# Patient Record
Sex: FEMALE | Race: WHITE | Employment: FULL TIME | URBAN - METROPOLITAN AREA
[De-identification: names, ages, dates, MRNs, and addresses within clinical notes are randomized per-mention and may not be internally consistent; named-entity substitution may affect disease eponyms.]

---

## 2018-06-22 LAB — CREATININE, EXTERNAL: 0.56

## 2019-01-04 ENCOUNTER — OFFICE VISIT (OUTPATIENT)
Dept: ENDOCRINOLOGY | Age: 54
End: 2019-01-04

## 2019-01-04 VITALS
BODY MASS INDEX: 28.39 KG/M2 | HEART RATE: 88 BPM | WEIGHT: 160.2 LBS | SYSTOLIC BLOOD PRESSURE: 121 MMHG | DIASTOLIC BLOOD PRESSURE: 71 MMHG | HEIGHT: 63 IN

## 2019-01-04 DIAGNOSIS — E06.3 HYPOTHYROIDISM DUE TO HASHIMOTO'S THYROIDITIS: Primary | ICD-10-CM

## 2019-01-04 DIAGNOSIS — E03.8 HYPOTHYROIDISM DUE TO HASHIMOTO'S THYROIDITIS: Primary | ICD-10-CM

## 2019-01-04 DIAGNOSIS — E04.1 THYROID NODULE: ICD-10-CM

## 2019-01-04 RX ORDER — CHOLECALCIFEROL (VITAMIN D3) 125 MCG
4000 CAPSULE ORAL DAILY
COMMUNITY

## 2019-01-04 RX ORDER — SUMATRIPTAN 100 MG/1
100 TABLET, FILM COATED ORAL
COMMUNITY
Start: 2016-01-06

## 2019-01-04 RX ORDER — MONTELUKAST SODIUM 4 MG/1
TABLET, CHEWABLE ORAL
Refills: 0 | COMMUNITY
Start: 2018-12-18

## 2019-01-04 RX ORDER — AMLODIPINE AND VALSARTAN 5; 320 MG/1; MG/1
TABLET ORAL
COMMUNITY
Start: 2017-07-13

## 2019-01-04 NOTE — PROGRESS NOTES
CONSULTATION REQUESTED BY: Anais Swann MD     REASON FOR CONSULT: Evaluation of thyroid nodule    CHIEF COMPLAINT: Thyroid nodule, hashimoto disease    HISTORY OF PRESENT ILLNESS:   David Schmidt is a 47 y.o. female with a PMHx as noted below who was referred to our endocrinology clinic for evaluation of a thyroid nodule and labs suggestive of hashimoto thyroiditis. Patient describes that she was diagnosed with CREST syndrome and Hashimoto thyroiditis. Also noted for a thyroid nodule with endocrinologist in [facility] in Sept,  12/18/18: Thyroid FNA Biopsy: well circ, R-mid hypoechoic nodule measuring 1.7 x 0.8 x 1.3 cm nodule,     : report reviewed: cytology: neg for malignancy, findings of hashimoto present. Family history is not significant for thyroid nodules or thyroid cancers. Patient denies any history of radiation exposure. They deny any dysphagia or dyspnea. Patient denies symptoms of hyperthyroidism,   She does admit to fatigue and weight gain, along with memory issues. Outside labs: 9/18/18:   TSH 1.59,   TPO and TgAb +    Review of other most recent thyroid function:  No results found for: TSH, FT4, T3LT, TSILT, TMCLT, TSHEXT   TSILT = Thyroid stimulating antibodies  TMCLT = TPO antibodies  T3LT = Total T3 levels    At this time they would like to further investigate the nature of the thyroid nodule.     PAST MEDICAL/SURGICAL HISTORY:   Past Medical History:   Diagnosis Date    CREST syndrome ([facility] [address]     Hashimoto's disease     Hypertension     Migraines      Past Surgical History:   Procedure Laterality Date    HX BREAST AUGMENTATION      HX CHOLECYSTECTOMY      HX GYN      eptopic pregnancy    HX ORTHOPAEDIC      both thumb joints replaced    HX TUBAL LIGATION         ALLERGIES:   No Known Allergies    MEDICATIONS ON ADMISSION:     Current Outpatient Medications:     amLODIPine-valsartan (EXFORGE) 5-320 mg per tablet, , Disp: , Rfl:     colestipol (COLESTID) 1 gram tablet, , Disp: , Rfl: 0    SUMAtriptan (IMITREX) 100 mg tablet, 100 mg., Disp: , Rfl:     OTHER, selinium 200 mg QD, Disp: , Rfl:     cholecalciferol, vitamin D3, (VITAMIN D3) 2,000 unit tab, Take 4,000 Units by mouth daily. , Disp: , Rfl:     SOCIAL HISTORY:   Social History     Socioeconomic History    Marital status:      Spouse name: Not on file    Number of children: Not on file    Years of education: Not on file    Highest education level: Not on file   Social Needs    Financial resource strain: Not on file    Food insecurity - worry: Not on file    Food insecurity - inability: Not on file   [employer] needs - medical: Not on file   [employer] needs - non-medical: Not on file   Occupational History    Not on file   Tobacco Use    Smoking status: Never Smoker    Smokeless tobacco: Never Used   Substance and Sexual Activity    Alcohol use: Yes     Comment: Rarely    Drug use: No    Sexual activity: Not on file   Other Topics Concern    Not on file   Social History Narrative    Not on file       FAMILY HISTORY:  Family History   Problem Relation Age of Onset    Thyroid Disease Mother     Osteoporosis Mother     No Known Problems Father     Dementia Maternal Grandmother     No Known Problems Maternal Grandfather        REVIEW OF SYSTEMS: Complete ROS assessed and noted for that which is described above, all else are negative.   Eyes: normal  ENT: normal  CVS: normal  Resp: normal  GI: normal  : normal  GYN: normal  Endocrine: normal  Integument: normal  Musculoskeletal: normal  Neuro: normal  Psych: normal    PHYSICAL EXAMINATION:    VITAL SIGNS:  Visit Vitals  /71   Pulse 88   Ht 5' 3\" (1.6 m)   Wt 160 lb 3.2 oz (72.7 kg)   BMI 28.38 kg/m²       GENERAL: NCAT, Sitting comfortably, NAD  EYES: EOMI, non-icteric, no proptosis  Ear/Nose/Throat: NCAT, no inflammation, thyroid gland is only slightly enlarged,  LYMPH NODES: No LAD  CARDIOVASCULAR: S1 S2, RRR, No murmur, 2+ radial pulses  RESPIRATORY: CTA b/l, no wheeze/rales  GASTROINTESTINAL: NT, ND  MUSCULOSKELETAL: Normal ROM, no atrophy  SKIN: warm, no edema/rash/ or other skin changes  NEUROLOGIC: 5/5 power all extremities, AAOx3, no tremors  PSYCHIATRIC: Normal affect, Normal insight and judgement      REVIEW OF LABORATORY AND RADIOLOGY DATA:   Labs and documentation have been reviewed as described above. ASSESSMENT AND PLAN:   Wilfredo Leigh is a 47 y.o. female with a PMHx as noted above who was referred to our endocrinology clinic for evaluation of a thyroid nodule and hashimoto thyroiditis. Thyroid Nodule  Hashimoto Thyroiditis    Today we spent time reviewing the natural history of thyroid disease, and the reassuring results she has had concerning her thyroid biopsy, and stable thyroid levels. However as she is gaining weight, with progressive fatigue, and with poor concentration, we will consider starting thyroid hormone as needed if thyroid levels permit. She is due for levels so we will evaluate this today. Plan as outlined below. Thyroid Nodule:   S/p multiple thyroid US in the past year, generally stable. S/p benign biopsy,   No dysphagia,  Plan for repeat US in 1-2 years unless symptomatic. Hashimoto thyroiditis:   Thyroid levels today, will start levothyroxine at low dose if levels permit,  Pre-labs before next visit ordered (will be completed near home),  Advised of proper way to take levothyroxine in case we do start it,     Welcome to call with concerns,   RTC in 10 weeks,    Jaxson ALVARADO  [address] [facility]

## 2019-01-04 NOTE — PATIENT INSTRUCTIONS
Labs today, will discuss result by phone. Will start therapy as needed with thyroid hormone,     If we do start thyroid hormone, remember to take levothyroxine with a glass of water only, on an empty stomach each morning, 1 hour prior to ingesting any other medications, including vitamins, food, coffee, tea, juice or any other beverages. All of these can reduce the absorption of thyroid hormone tablets and result in fluctuating levels in the blood and on labs, affecting also how one feels. Plan to return in about 10 weeks, for follow up. Plan to repeat blood work 3 days before next visit using the lab slip provided. Call with concerns,     Tariq Sanchez.  [address] [facility] End[facility]  [address] [place] [address]

## 2019-01-05 LAB
T4 FREE SERPL-MCNC: 1.29 NG/DL (ref 0.82–1.77)
TSH SERPL DL<=0.005 MIU/L-ACNC: 1.5 UIU/ML (ref 0.45–4.5)

## 2019-01-07 RX ORDER — LEVOTHYROXINE SODIUM 25 UG/1
25 TABLET ORAL
Qty: 90 TAB | Refills: 3 | Status: SHIPPED | OUTPATIENT
Start: 2019-01-07 | End: 2019-03-25 | Stop reason: DRUGHIGH

## 2019-01-08 ENCOUNTER — TELEPHONE (OUTPATIENT)
Dept: ENDOCRINOLOGY | Age: 54
End: 2019-01-08

## 2019-01-08 NOTE — PROGRESS NOTES
Thyroid levels normal, not too much room for thyroid hormone replacement, however I am willing to trial a very low does at 25 mcg once daily of levothyroxine, ordered. If tolerated well, we can consider slowly increasing doses in a step wise fashion if able on future visits. She should repeat labs 3 days before next visit as ordered. Also, Remember to take levothyroxine with a glass of water only, on an empty stomach each morning, 1 hour prior to ingesting any other medications, including vitamins, food, coffee, tea, juice or any other beverages. All of these can reduce the absorption of thyroid hormone tablets and result in fluctuating levels in the blood and on labs, affecting also how one feels. Veto Crandall.  [address] [facility] End[facility]  [address] [place] [address]

## 2019-01-08 NOTE — TELEPHONE ENCOUNTER
----- Message from Daphnie Garner MD sent at 1/7/2019 11:19 PM EST -----  Thyroid levels normal, not too much room for thyroid hormone replacement, however I am willing to trial a very low does at 25 mcg once daily of levothyroxine, ordered. If tolerated well, we can consider slowly increasing doses in a step wise fashion if able on future visits. She should repeat labs 3 days before next visit as ordered. Also, Remember to take levothyroxine with a glass of water only, on an empty stomach each morning, 1 hour prior to ingesting any other medications, including vitamins, food, coffee, tea, juice or any other beverages. All of these can reduce the absorption of thyroid hormone tablets and result in fluctuating levels in the blood and on labs, affecting also how one feels. Lucero Kevin.  [address] [place] [address] Endocrinology  [address] [place] [address]

## 2019-01-08 NOTE — TELEPHONE ENCOUNTER
I called Ms. Kimberlee and relayed the message from Dr. Leena Hall. She understood this information and will do as instructed.   Angelina Falcon

## 2019-03-15 ENCOUNTER — OFFICE VISIT (OUTPATIENT)
Dept: ENDOCRINOLOGY | Age: 54
End: 2019-03-15

## 2019-03-15 VITALS
SYSTOLIC BLOOD PRESSURE: 116 MMHG | WEIGHT: 162 LBS | DIASTOLIC BLOOD PRESSURE: 76 MMHG | HEIGHT: 63 IN | HEART RATE: 111 BPM | BODY MASS INDEX: 28.7 KG/M2

## 2019-03-15 DIAGNOSIS — E04.1 THYROID NODULE: Primary | ICD-10-CM

## 2019-03-15 DIAGNOSIS — E03.8 HYPOTHYROIDISM DUE TO HASHIMOTO'S THYROIDITIS: ICD-10-CM

## 2019-03-15 DIAGNOSIS — E06.3 HYPOTHYROIDISM DUE TO HASHIMOTO'S THYROIDITIS: ICD-10-CM

## 2019-03-15 RX ORDER — ESOMEPRAZOLE MAGNESIUM 40 MG/1
CAPSULE, DELAYED RELEASE ORAL
Refills: 0 | COMMUNITY
Start: 2019-03-05

## 2019-03-15 RX ORDER — AMLODIPINE BESYLATE 5 MG/1
1 TABLET ORAL
COMMUNITY
End: 2019-03-15 | Stop reason: CLARIF

## 2019-03-15 RX ORDER — GABAPENTIN 100 MG/1
CAPSULE ORAL
Refills: 0 | COMMUNITY
Start: 2019-01-14

## 2019-03-15 NOTE — PROGRESS NOTES
CHIEF COMPLAINT: f/u thyroid nodule, hashimoto disease    HISTORY OF PRESENT ILLNESS:   Wilfredo Leigh is a 49 y.o. female with a PMHx as noted below who presents for f/u evaluation of a thyroid nodule and labs suggestive of hashimoto thyroiditis. Patient describes that she was diagnosed with CREST syndrome and Hashimoto thyroiditis. Outiside Tg and TPO antibodies were positive, reviewed, scanned to chart,  Thyroid levels were obtained initially, stable, patient preferred treatment,  We started 25 mcg once daily of levothyroxine,  She has been taking once each day since that time,  Labs obtained outside: 3/11/19: TSH 1.12, FT4 1.36,  Denies palpitations but reports feeling better,    Thyroid nodules:  Also noted for a thyroid nodule with endocrinologist in [place] in Sept,  12/18/18: Thyroid FNA Biopsy: well circ, R-mid hypoechoic nodule measuring 1.7 x 0.8 x 1.3 cm nodule,     : report reviewed: cytology: neg for malignancy, findings of hashimoto present. Currently without any dysphagia or dyspnea. Review of other most recent thyroid function:  Lab Results   Component Value Date    TSH 1.500 01/04/2019    FT4 1.29 01/04/2019      TSILT = Thyroid stimulating antibodies  TMCLT = TPO antibodies  T3LT = Total T3 levels    At this time they would like to further investigate the nature of the thyroid nodule.     PAST MEDICAL/SURGICAL HISTORY:   Past Medical History:   Diagnosis Date    CREST syndrome ([address]     Hashimoto's disease     Hypertension     Migraines      Past Surgical History:   Procedure Laterality Date    HX BREAST AUGMENTATION      HX CHOLECYSTECTOMY      HX GYN      eptopic pregnancy    HX ORTHOPAEDIC      both thumb joints replaced    HX TUBAL LIGATION         ALLERGIES:   No Known Allergies    MEDICATIONS ON ADMISSION:     Current Outpatient Medications:     esomeprazole (NEXIUM) 40 mg capsule, , Disp: , Rfl: 0    levothyroxine (SYNTHROID) 25 mcg tablet, Take 1 Tab by mouth Daily (before breakfast). , Disp: 90 Tab, Rfl: 3    amLODIPine-valsartan (EXFORGE) 5-320 mg per tablet, , Disp: , Rfl:     colestipol (COLESTID) 1 gram tablet, , Disp: , Rfl: 0    cholecalciferol, vitamin D3, (VITAMIN D3) 2,000 unit tab, Take 4,000 Units by mouth daily. , Disp: , Rfl:     gabapentin (NEURONTIN) 100 mg capsule, give MILDRED 2 capsules by mouth every 12 hours, Disp: , Rfl: 0    SUMAtriptan (IMITREX) 100 mg tablet, 100 mg., Disp: , Rfl:     OTHER, selinium 200 mg QD, Disp: , Rfl:     SOCIAL HISTORY:   Social History     Socioeconomic History    Marital status:      Spouse name: Not on file    Number of children: Not on file    Years of education: Not on file    Highest education level: Not on file   Social Needs    Financial resource strain: Not on file    Food insecurity - worry: Not on file    Food insecurity - inability: Not on file   [employer] needs - medical: Not on file   [employer] needs - non-medical: Not on file   Occupational History    Not on file   Tobacco Use    Smoking status: Never Smoker    Smokeless tobacco: Never Used   Substance and Sexual Activity    Alcohol use: Yes     Comment: Rarely    Drug use: No    Sexual activity: Not on file   Other Topics Concern    Not on file   Social History Narrative    Not on file       FAMILY HISTORY:  Family History   Problem Relation Age of Onset    Thyroid Disease Mother     Osteoporosis Mother     No Known Problems Father     Dementia Maternal Grandmother     No Known Problems Maternal Grandfather        REVIEW OF SYSTEMS: Complete ROS assessed and noted for that which is described above, all else are negative.   Eyes: normal  ENT: normal  CVS: normal  Resp: normal  GI: normal  : normal  GYN: normal  Endocrine: normal  Integument: normal  Musculoskeletal: normal  Neuro: normal  Psych: normal    PHYSICAL EXAMINATION:    VITAL SIGNS:  Visit Vitals  /76 (BP 1 Location: Left arm, BP Patient Position: Sitting)   Pulse (!) 111   Ht 5' 3\" (1.6 m)   Wt 162 lb (73.5 kg)   BMI 28.70 kg/m²       GENERAL: NCAT, Sitting comfortably, NAD  EYES: EOMI, non-icteric, no proptosis  Ear/Nose/Throat: NCAT, no inflammation, thyroid gland is only slightly enlarged,  LYMPH NODES: No LAD  CARDIOVASCULAR: S1 S2, RRR, No murmur, 2+ radial pulses  RESPIRATORY: CTA b/l, no wheeze/rales  GASTROINTESTINAL: NT, ND  MUSCULOSKELETAL: Normal ROM, no atrophy  SKIN: warm, no edema/rash/ or other skin changes  NEUROLOGIC: 5/5 power all extremities, AAOx3, no tremors  PSYCHIATRIC: Normal affect, Normal insight and judgement    REVIEW OF LABORATORY AND RADIOLOGY DATA:   Labs and documentation have been reviewed as described above. ASSESSMENT AND PLAN:   Naveed Tolentino is a 49 y.o. female with a PMHx as noted above who presents for f/u of a thyroid nodule and hashimoto thyroiditis. Thyroid Nodule  Hashimoto Thyroiditis    Thyroid Nodule:   S/p multiple thyroid US in the past year, generally stable. S/p benign biopsy,   No dysphagia,  She brought me the US/Bx on disc from [facility], will hold on to this,  Plan for repeat US [id number] unless symptomatic. Hashimoto thyroiditis:   Tolerating 25 mcg levothyroxine once daily while levels did not rise too much despite her starting TSH of <2,  She would like to proceed with a trial of 50 mcg once daily,  We discussed the symptoms to be aware of (palpitations / tremors / etc)  She will double her 25 mcg tabs since she has a new bottle, then will let us know if tolerating, can order 50 mcg tabs then. I advised her to let me know through [employer] how she is doing,    25 minutes spent together with patient today of which >50% of this time was spent in counseling and coordination of care. Erna Casillas.  [address]

## 2019-03-15 NOTE — PATIENT INSTRUCTIONS
PLEASE READ THESE INSTRUCTIONS:     1. Plan to take 50 mcg (25mg x2) of levothyroxine each morning. If tolerating and you need a refill let me know and we can send script for 50mcg tabs to take once daily if feeling stable. 2. Remember to take levothyroxine with a glass of water only, on an empty stomach each morning, 1 hour prior to ingesting any other medications, including vitamins, food, coffee, tea, juice or any other beverages. All of these can reduce the absorption of thyroid hormone tablets and result in fluctuating levels in the blood and on labs, affecting also how one feels. 3. We will plan for you to return to clinic in 3 months for re-evaluation,    4. I have provided you with a lab order sheet so you can have your labs repeated 2 days before your next visit. This way we can have the results available to us in time for your visit so we can make the best decisions at that time.

## 2019-06-25 ENCOUNTER — OFFICE VISIT (OUTPATIENT)
Dept: ENDOCRINOLOGY | Age: 54
End: 2019-06-25

## 2019-06-25 VITALS
WEIGHT: 161 LBS | SYSTOLIC BLOOD PRESSURE: 110 MMHG | HEART RATE: 82 BPM | BODY MASS INDEX: 28.53 KG/M2 | HEIGHT: 63 IN | DIASTOLIC BLOOD PRESSURE: 75 MMHG

## 2019-06-25 DIAGNOSIS — E03.8 HYPOTHYROIDISM DUE TO HASHIMOTO'S THYROIDITIS: ICD-10-CM

## 2019-06-25 DIAGNOSIS — E04.1 THYROID NODULE: Primary | ICD-10-CM

## 2019-06-25 DIAGNOSIS — E06.3 HYPOTHYROIDISM DUE TO HASHIMOTO'S THYROIDITIS: ICD-10-CM

## 2019-06-25 NOTE — PROGRESS NOTES
CHIEF COMPLAINT: f/u thyroid nodule, hashimoto disease    HISTORY OF PRESENT ILLNESS:   Renita Travis is a 49 y.o. female with a PMHx as noted below who presents for f/u evaluation of a thyroid nodule and labs suggestive of hashimoto thyroiditis. Patient describes that she was diagnosed with CREST syndrome and Hashimoto thyroiditis. Outiside Tg and TPO antibodies were positive, reviewed, scanned to chart,  Thyroid levels were obtained initially, stable, patient preferred treatment,  We started 25 mcg once daily of levothyroxine, tolerated, increased to 50 mcg on last visit. Tolerating 50 mcg daily w/o symptoms of hyper or hypothyroidism today, overall feeling well,  Weight is stable compared with last visit. Labs completed outside, not yet received from [facility], working to retreive outside lab results,    Thyroid nodules:  Also noted for a thyroid nodule with endocrinologist in [place] in Sept,  12/18/18: Thyroid FNA Biopsy: well circ, R-mid hypoechoic nodule measuring 1.7 x 0.8 x 1.3 cm nodule,     : report reviewed: cytology: neg for malignancy, findings of hashimoto present. Currently with mild dysphagic symptoms,     Review of other most recent thyroid function:  Lab Results   Component Value Date    TSH 1.500 01/04/2019    FT4 1.29 01/04/2019      TSILT = Thyroid stimulating antibodies  TMCLT = TPO antibodies  T3LT = Total T3 levels    PAST MEDICAL/SURGICAL HISTORY:   Past Medical History:   Diagnosis Date    CREST syndrome ([address]     Hashimoto's disease     Hypertension     Migraines      Past Surgical History:   Procedure Laterality Date    HX BREAST AUGMENTATION      HX CHOLECYSTECTOMY      HX GYN      eptopic pregnancy    HX ORTHOPAEDIC      both thumb joints replaced    HX TUBAL LIGATION         ALLERGIES:   No Known Allergies    MEDICATIONS ON ADMISSION:     Current Outpatient Medications:     levothyroxine (SYNTHROID) 50 mcg tablet, Take 1 Tab by mouth Daily (before breakfast). , Disp: 90 Tab, Rfl: 3    esomeprazole (NEXIUM) 40 mg capsule, , Disp: , Rfl: 0    amLODIPine-valsartan (EXFORGE) 5-320 mg per tablet, , Disp: , Rfl:     colestipol (COLESTID) 1 gram tablet, , Disp: , Rfl: 0    cholecalciferol, vitamin D3, (VITAMIN D3) 2,000 unit tab, Take 4,000 Units by mouth daily. , Disp: , Rfl:     gabapentin (NEURONTIN) 100 mg capsule, give MILDRED 2 capsules by mouth every 12 hours, Disp: , Rfl: 0    SUMAtriptan (IMITREX) 100 mg tablet, 100 mg., Disp: , Rfl:     OTHER, selinium 200 mg QD, Disp: , Rfl:     SOCIAL HISTORY:   Social History     Socioeconomic History    Marital status:      Spouse name: Not on file    Number of children: Not on file    Years of education: Not on file    Highest education level: Not on file   Occupational History    Not on file   Social Needs    Financial resource strain: Not on file    Food insecurity:     Worry: Not on file     Inability: Not on file    Transportation needs:     Medical: Not on file     Non-medical: Not on file   Tobacco Use    Smoking status: Never Smoker    Smokeless tobacco: Never Used   Substance and Sexual Activity    Alcohol use: Yes     Comment: Rarely    Drug use: No    Sexual activity: Not on file   Lifestyle    Physical activity:     Days per week: Not on file     Minutes per session: Not on file    Stress: Not on file   Relationships    Social connections:     Talks on phone: Not on file     Gets together: Not on file     Attends [employer] service: Not on file     Active member of club or organization: Not on file     Attends meetings of clubs or organizations: Not on file     Relationship status: Not on file    Intimate partner violence:     Fear of current or ex partner: Not on file     Emotionally abused: Not on file     Physically abused: Not on file     Forced sexual activity: Not on file   Other Topics Concern    Not on file   Social History Narrative    Not on file       FAMILY HISTORY:  Family History   Problem Relation Age of Onset    Thyroid Disease Mother     Osteoporosis Mother     No Known Problems Father     Dementia Maternal Grandmother     No Known Problems Maternal Grandfather        REVIEW OF SYSTEMS: Complete ROS assessed and noted for that which is described above, all else are negative. Eyes: normal  ENT: normal  CVS: normal  Resp: normal  GI: normal  : normal  GYN: normal  Endocrine: normal  Integument: normal  Musculoskeletal: normal  Neuro: normal  Psych: normal    PHYSICAL EXAMINATION:    VITAL SIGNS:  Visit Vitals  /75 (BP 1 Location: Left arm, BP Patient Position: Sitting)   Pulse 82   Ht 5' 3\" (1.6 m)   Wt 161 lb (73 kg)   BMI 28.52 kg/m²       GENERAL: NCAT, Sitting comfortably, NAD  EYES: EOMI, non-icteric, no proptosis  Ear/Nose/Throat: NCAT, no inflammation, thyroid gland is only slightly enlarged,  LYMPH NODES: No LAD  CARDIOVASCULAR: S1 S2, RRR, No murmur, 2+ radial pulses  RESPIRATORY: CTA b/l, no wheeze/rales  GASTROINTESTINAL: NT, ND  MUSCULOSKELETAL: Normal ROM, no atrophy  SKIN: warm, no edema/rash/ or other skin changes  NEUROLOGIC: 5/5 power all extremities, AAOx3, no tremors  PSYCHIATRIC: Normal affect, Normal insight and judgement    REVIEW OF LABORATORY AND RADIOLOGY DATA:   Labs and documentation have been reviewed as described above. ASSESSMENT AND PLAN:   Katy Ivory is a 49 y.o. female with a PMHx as noted above who presents for f/u of a thyroid nodule and hashimoto thyroiditis.      Thyroid Nodule  Hashimoto Thyroiditis    Hypothyroidism:   50 mcg once daily of levothyroxine,   Will review outside lab and call with dose adjustment as needed,   She would like to utilize her new 50 mcg tabs as she just received them, will try,  Likely we will continue to increase dose further as able,  Some mild fullness reported in the neck, may be related to hashimoto disease,   Will consider if repeat US warranted sooner vs advancing thyroid hormone dose, Thyroid Nodule:   S/p multiple thyroid US in the past year, generally stable. S/p benign biopsy,   Plan for repeat US [id number] unless symptomatic. RTC in 6 months with thyroid prelabs,    Kelli Corona MD  [facility]    ------  Addendum: 6/25/2019, 4:13 PM  Outside labs received from [facility] dated 6/19/19  TSH 0.9  FT4 1.4  No change in thyroid dose, 50 mcg once daily,   I called to notify patient,     Mai Kaye MD

## 2019-12-10 ENCOUNTER — OFFICE VISIT (OUTPATIENT)
Dept: ENDOCRINOLOGY | Age: 54
End: 2019-12-10

## 2019-12-10 VITALS
BODY MASS INDEX: 28.53 KG/M2 | SYSTOLIC BLOOD PRESSURE: 98 MMHG | HEIGHT: 63 IN | HEART RATE: 99 BPM | WEIGHT: 161 LBS | DIASTOLIC BLOOD PRESSURE: 62 MMHG

## 2019-12-10 DIAGNOSIS — E04.1 THYROID NODULE: Primary | ICD-10-CM

## 2019-12-10 DIAGNOSIS — E06.3 HYPOTHYROIDISM DUE TO HASHIMOTO'S THYROIDITIS: ICD-10-CM

## 2019-12-10 DIAGNOSIS — E03.8 HYPOTHYROIDISM DUE TO HASHIMOTO'S THYROIDITIS: ICD-10-CM

## 2019-12-10 RX ORDER — MOXIFLOXACIN 5 MG/ML
SOLUTION/ DROPS OPHTHALMIC
Refills: 0 | COMMUNITY
Start: 2019-12-01

## 2019-12-10 NOTE — PROGRESS NOTES
CHIEF COMPLAINT: f/u thyroid nodule, hashimoto disease    HISTORY OF PRESENT ILLNESS:   Elsa Gan is a 49 y.o. female with a PMHx as noted below who presents for f/u evaluation of a thyroid nodule and labs suggestive of hashimoto thyroiditis. Hypothyroidism:  Hx of CREST syndrome and Hashimoto thyroiditis. Outiside Tg and TPO antibodies were positive,  We started therapy with thyroid hormone replacement,  She continues on 50 mcg levothyroxine once daily,  Recent outside labs dated: 12 /9/19: TSH 0.995, FT4 1.45  Clinically euthyroid,    Thyroid nodules:  Also noted for a thyroid nodule with endocrinologist in [place] in Sept,  12/18/18: Thyroid FNA Biopsy: well circ, R-mid hypoechoic nodule measuring 1.7 x 0.8 x 1.3 cm nodule,     : report reviewed: cytology: neg for malignancy, findings of hashimoto present.   Currently with mild dysphagic symptoms,     Review of other most recent thyroid function:  Lab Results   Component Value Date    TSH 1.500 01/04/2019    FT4 1.29 01/04/2019      TSILT = Thyroid stimulating antibodies  TMCLT = TPO antibodies  T3LT = Total T3 levels    PAST MEDICAL/SURGICAL HISTORY:   Past Medical History:   Diagnosis Date    CREST syndrome ([facility] [address]     Hashimoto's disease     Hypertension     Migraines      Past Surgical History:   Procedure Laterality Date    HX BREAST AUGMENTATION      HX CHOLECYSTECTOMY      HX GYN      eptopic pregnancy    HX ORTHOPAEDIC      both thumb joints replaced    HX TUBAL LIGATION         ALLERGIES:   No Known Allergies    MEDICATIONS ON ADMISSION:     Current Outpatient Medications:     moxifloxacin (VIGAMOX) 0.5 % ophthalmic solution, instill 1 drop INTO AFFECTED EYE(S) three times a day for 7 days, Disp: , Rfl: 0    esomeprazole (NEXIUM) 40 mg capsule, , Disp: , Rfl: 0    amLODIPine-valsartan (EXFORGE) 5-320 mg per tablet, , Disp: , Rfl:     colestipol (COLESTID) 1 gram tablet, , Disp: , Rfl: 0    cholecalciferol, vitamin D3, (VITAMIN D3) 2,000 unit tab, Take 4,000 Units by mouth daily. , Disp: , Rfl:     levothyroxine (SYNTHROID) 50 mcg tablet, Take 1 Tab by mouth Daily (before breakfast). , Disp: 90 Tab, Rfl: 3    gabapentin (NEURONTIN) 100 mg capsule, give MILDRED 2 capsules by mouth every 12 hours, Disp: , Rfl: 0    SUMAtriptan (IMITREX) 100 mg tablet, 100 mg., Disp: , Rfl:     OTHER, selinium 200 mg QD, Disp: , Rfl:     SOCIAL HISTORY:   Social History     Socioeconomic History    Marital status:      Spouse name: Not on file    Number of children: Not on file    Years of education: Not on file    Highest education level: Not on file   Occupational History    Not on file   Social Needs    Financial resource strain: Not on file    Food insecurity:     Worry: Not on file     Inability: Not on file    Transportation needs:     Medical: Not on file     Non-medical: Not on file   Tobacco Use    Smoking status: Never Smoker    Smokeless tobacco: Never Used   Substance and Sexual Activity    Alcohol use: Yes     Comment: Rarely    Drug use: No    Sexual activity: Not on file   Lifestyle    Physical activity:     Days per week: Not on file     Minutes per session: Not on file    Stress: Not on file   Relationships    Social connections:     Talks on phone: Not on file     Gets together: Not on file     Attends Advent service: Not on file     Active member of club or organization: Not on file     Attends meetings of clubs or organizations: Not on file     Relationship status: Not on file    Intimate partner violence:     Fear of current or ex partner: Not on file     Emotionally abused: Not on file     Physically abused: Not on file     Forced sexual activity: Not on file   Other Topics Concern    Not on file   Social History Narrative    Not on file       FAMILY HISTORY:  Family History   Problem Relation Age of Onset    Thyroid Disease Mother     Osteoporosis Mother     No Known Problems Father     Dementia Maternal Grandmother     No Known Problems Maternal Grandfather        REVIEW OF SYSTEMS: Complete ROS assessed and noted for that which is described above, all else are negative. Eyes: normal  ENT: normal  CVS: normal  Resp: normal  GI: normal  : normal  GYN: normal  Endocrine: normal  Integument: normal  Musculoskeletal: normal  Neuro: normal  Psych: normal    PHYSICAL EXAMINATION:    VITAL SIGNS:  Visit Vitals  BP 98/62 (BP 1 Location: Left arm, BP Patient Position: Sitting)   Pulse 99   Ht 5' 3\" (1.6 m)   Wt 161 lb (73 kg)   BMI 28.52 kg/m²       GENERAL: NCAT, Sitting comfortably, NAD  EYES: EOMI, non-icteric, no proptosis  Ear/Nose/Throat: NCAT, no inflammation, thyroid gland is only slightly enlarged,  LYMPH NODES: No LAD  CARDIOVASCULAR: S1 S2, RRR, No murmur, 2+ radial pulses  RESPIRATORY: CTA b/l, no wheeze/rales  GASTROINTESTINAL: NT, ND  MUSCULOSKELETAL: Normal ROM, no atrophy  SKIN: warm, no edema/rash/ or other skin changes  NEUROLOGIC: 5/5 power all extremities, AAOx3, no tremors  PSYCHIATRIC: Normal affect, Normal insight and judgement    REVIEW OF LABORATORY AND RADIOLOGY DATA:   Labs and documentation have been reviewed as described above. ASSESSMENT AND PLAN:   Vasile Kumar is a 49 y.o. female with a PMHx as noted above who presents for f/u of a thyroid nodule and hashimoto thyroiditis. Thyroid Nodule  Hashimoto Thyroiditis    Hypothyroidism:   Plan to continue with 50 mcg once daily of levothyroxine,   Will monitor and titrate dose as will likely need increase with time,  Thyroid levels before next visit,    Thyroid Nodule:   S/p multiple thyroid US recently, generally stable. S/p benign biopsy,   Plan for repeat US before next visit in 3 months,      RTC in 6 months with thyroid Juliana iniguez T.  [address] [facility]

## 2019-12-10 NOTE — PATIENT INSTRUCTIONS
PLEASE READ THESE INSTRUCTIONS:  
 
1. Plan to take 50 mcg of levothyroxine each morning. 2. Remember to take levothyroxine with a glass of water only, on an empty stomach each morning, 1 hour prior to ingesting any other medications, including vitamins, food, coffee, tea, juice or any other beverages. All of these can reduce the absorption of thyroid hormone tablets and result in fluctuating levels in the blood and on labs, affecting also how one feels. 3. We will plan for you to return to clinic in 6 months for re-evaluation, 
 
4. I have provided you with a lab order sheet so you can have your labs repeated 2 days before your next visit. This way we can have the results available to us in time for your visit so we can make the best decisions at that time. Thyroid Ultrasound: You can go ahead and call the scheduling department to have your procedure scheduled at a time that is convenient to you. Please call [phone].

## 2020-03-10 RX ORDER — LEVOTHYROXINE SODIUM 50 UG/1
50 TABLET ORAL
Qty: 90 TAB | Refills: 3 | Status: SHIPPED | OUTPATIENT
Start: 2020-03-10 | End: 2020-03-11

## 2020-03-10 NOTE — TELEPHONE ENCOUNTER
Medication Renewal Request   Received: 3 days ago   Message Contents   Pedro Killian Nurse Pool   Phone Number: [phone]             Neetu Sánchez would like a refill of the following medications:         levothyroxine (SYNTHROID) 50 mcg tablet Juan J Cruz MD]     Preferred pharmacy: [facility] [address] [id number] [address] [facility]

## 2020-03-11 RX ORDER — LEVOTHYROXINE SODIUM 50 UG/1
TABLET ORAL
Qty: 90 TAB | Refills: 3 | Status: SHIPPED | OUTPATIENT
Start: 2020-03-11 | End: 2021-03-16 | Stop reason: SDUPTHER

## 2020-06-15 ENCOUNTER — HOSPITAL ENCOUNTER (OUTPATIENT)
Dept: ULTRASOUND IMAGING | Age: 55
Discharge: HOME | End: 2020-06-15
Payer: COMMERCIAL

## 2020-06-15 DIAGNOSIS — E04.1 THYROID NODULE: ICD-10-CM

## 2020-06-15 PROCEDURE — 76536 US EXAM OF HEAD AND NECK: CPT

## 2020-06-22 ENCOUNTER — TELEPHONE (OUTPATIENT)
Dept: ENDOCRINOLOGY | Age: 55
End: 2020-06-22

## 2020-06-22 ENCOUNTER — VIRTUAL VISIT (OUTPATIENT)
Dept: ENDOCRINOLOGY | Age: 55
End: 2020-06-22

## 2020-06-22 DIAGNOSIS — E03.8 HYPOTHYROIDISM DUE TO HASHIMOTO'S THYROIDITIS: ICD-10-CM

## 2020-06-22 DIAGNOSIS — E04.1 THYROID NODULE: Primary | ICD-10-CM

## 2020-06-22 DIAGNOSIS — E06.3 HYPOTHYROIDISM DUE TO HASHIMOTO'S THYROIDITIS: ICD-10-CM

## 2020-06-22 NOTE — TELEPHONE ENCOUNTER
I have called the lab at [facility] and they said they would fax them today.   Hector Mullins

## 2020-06-22 NOTE — TELEPHONE ENCOUNTER
Janusz Rueda, patient had labs at [facility] in [place] this past weekend, thyroid levels, can we have them fax the results to us, I went through all the paper work and cannot find it. If it was received today, could you add the TSH/FT4 level here for me to review, thanks ! Daysi Lockhart.  [address] [facility]  [address] [place] [address]

## 2020-06-22 NOTE — TELEPHONE ENCOUNTER
Labs ordered for patients next visit. She would like it mailed to her as she will not be using lab [facility], thanks ! Prev message sent also about getting her recent labs from the lab in [place]. Stella Bray.  [address] [facility]  [address] [place] [address]

## 2020-06-22 NOTE — PROGRESS NOTES
**DUE TO PANDEMIC AND HEALTH CONCERNS IN THE COMMUNITY, THIS PATIENT WAS EITHER ILL OR FOUND TO BE HIGH RISK FOR IN-PERSON EVALUATION WITHIN THE CLINIC. THE FOLLOWING IS A VIRTUAL TELEMEDICINE VIDEO ENCOUNTER VIA [employer], TO WHICH THE PATIENT AGREED. THE PURPOSE IS TO LIMIT INTERRUPTIONS IN HEALTHCARE AND TO PROVIDE FOR ONGOING URGENT NEEDS UNDER THE CURRENT CONDITIONS. CHIEF COMPLAINT: f/u thyroid nodule, hashimoto disease    HISTORY OF PRESENT ILLNESS:   Bárbara Gutierrez is a 48 y.o. female with a PMHx as noted below who presents for f/u evaluation of a thyroid nodule and labs suggestive of hashimoto thyroiditis. Hypothyroidism:  Hx of CREST syndrome and Hashimoto thyroiditis. Outiside Tg and TPO antibodies were positive,  We started therapy with thyroid hormone replacement,  She continues on 50 mcg levothyroxine once daily,  Clinically euthyroid,  Will reach out to outside lab for recent lab results,    Thyroid nodules:  Also noted for a thyroid nodule with endocrinologist in [facility] in Sept,  12/18/18: Thyroid FNA Biopsy: well circ, R-mid 1.7 x 0.8 x 1.3 cm nodule,    cytology: neg for malignancy, findings of hashimoto present.   6/15/20: Right-mid 1.6 x 0.8 x 1.4 cm nodule,    Previously biopsied as noted above,    Relatively unchanged in size,    Review of other most recent thyroid function:  Lab Results   Component Value Date    TSH 1.500 01/04/2019    FT4 1.29 01/04/2019      TSILT = Thyroid stimulating antibodies  TMCLT = TPO antibodies  T3LT = Total T3 levels    PAST MEDICAL/SURGICAL HISTORY:   Past Medical History:   Diagnosis Date    CREST syndrome ([facility] [address]     Hashimoto's disease     Hypertension     Migraines      Past Surgical History:   Procedure Laterality Date    HX BREAST AUGMENTATION      HX CHOLECYSTECTOMY      HX GYN      eptopic pregnancy    HX ORTHOPAEDIC      both thumb joints replaced    HX TUBAL LIGATION         ALLERGIES:   No Known Allergies    MEDICATIONS ON ADMISSION: Current Outpatient Medications:     calcium carbonate (CALCIUM 600 PO), Take  by mouth., Disp: , Rfl:     levothyroxine (SYNTHROID) 50 mcg tablet, TAKE 1 TABLET BY MOUTH ONCE DAILY BEFORE BREAKFAST, Disp: 90 Tab, Rfl: 3    esomeprazole (NEXIUM) 40 mg capsule, , Disp: , Rfl: 0    amLODIPine-valsartan (EXFORGE) 5-320 mg per tablet, , Disp: , Rfl:     colestipol (COLESTID) 1 gram tablet, , Disp: , Rfl: 0    cholecalciferol, vitamin D3, (VITAMIN D3) 2,000 unit tab, Take 4,000 Units by mouth daily. , Disp: , Rfl:     moxifloxacin (VIGAMOX) 0.5 % ophthalmic solution, instill 1 drop INTO AFFECTED EYE(S) three times a day for 7 days, Disp: , Rfl: 0    gabapentin (NEURONTIN) 100 mg capsule, give MILDRED 2 capsules by mouth every 12 hours, Disp: , Rfl: 0    SUMAtriptan (IMITREX) 100 mg tablet, 100 mg., Disp: , Rfl:     OTHER, selinium 200 mg QD, Disp: , Rfl:     SOCIAL HISTORY:   Social History     Socioeconomic History    Marital status:      Spouse name: Not on file    Number of children: Not on file    Years of education: Not on file    Highest education level: Not on file   Occupational History    Not on file   Social Needs    Financial resource strain: Not on file    Food insecurity     Worry: Not on file     Inability: Not on file    Transportation needs     Medical: Not on file     Non-medical: Not on file   Tobacco Use    Smoking status: Never Smoker    Smokeless tobacco: Never Used   Substance and Sexual Activity    Alcohol use: Yes     Comment: Rarely    Drug use: No    Sexual activity: Not on file   Lifestyle    Physical activity     Days per week: Not on file     Minutes per session: Not on file    Stress: Not on file   Relationships    Social connections     Talks on phone: Not on file     Gets together: Not on file     Attends Yazdanism service: Not on file     Active member of club or organization: Not on file     Attends meetings of clubs or organizations: Not on file Relationship status: Not on file    Intimate partner violence     Fear of current or ex partner: Not on file     Emotionally abused: Not on file     Physically abused: Not on file     Forced sexual activity: Not on file   Other Topics Concern    Not on file   Social History Narrative    Not on file       FAMILY HISTORY:  Family History   Problem Relation Age of Onset    Thyroid Disease Mother     Osteoporosis Mother     No Known Problems Father     Dementia Maternal Grandmother     No Known Problems Maternal Grandfather        REVIEW OF SYSTEMS: Complete ROS assessed and noted for that which is described above, all else are negative. Eyes: normal  ENT: normal  CVS: normal  Resp: normal  GI: normal  : normal  GYN: normal  Endocrine: normal  Integument: normal  Musculoskeletal: normal  Neuro: normal  Psych: normal    PHYSICAL EXAMINATION:  Telemedicine Visit    GENERAL: NCAT, Appears well nourished  EYES: EOMI, non-icteric, no proptosis  Ear/Nose/Throat: NCAT, no visible inflammation or masses  CARDIOVASCULAR: no cyanosis, no visible JVD  RESPIRATORY: comfortable respirations observed, no cyanosis  MUSCULOSKELETAL: Normal ROM of upper extremities observed  SKIN: No edema, rash, or other significant changes observed  NEUROLOGIC:  AAOx3  PSYCHIATRIC: Normal affect, Normal insight and judgement    REVIEW OF LABORATORY AND RADIOLOGY DATA:   Labs and documentation have been reviewed as described above. ASSESSMENT AND PLAN:   Mayi Victoria is a 48 y.o. female with a PMHx as noted above who presents for f/u of a thyroid nodule and hashimoto thyroiditis.      Thyroid Nodule  Hashimoto Thyroiditis    Hypothyroidism:   Will reach out to her lab for results,  Plan to continue with 50 mcg once daily of levothyroxine,   Will monitor and titrate dose as will likely need increase with time,  Thyroid levels before next visit, lab slip to be mailed to her home,    Thyroid Nodule:   Stable thyroid US recently,   I reviewed the images of her thyroid ultrasound, select image included above,  S/p benign biopsy,   Plan for repeat US closer to 2023 unless she has symptoms warranting sooner evaluation,     RTC in 6 months with thyroid prelabs, Dec 7 at 2:50,     20 minutes spent toward telemedicine visit today of which >50% of this time was spent in counseling and coordination of care. Lyndsay Herndon.  [address] [facility]

## 2020-12-23 ENCOUNTER — DOCUMENTATION ONLY (OUTPATIENT)
Dept: ENDOCRINOLOGY | Age: 55
End: 2020-12-23

## 2020-12-23 NOTE — PROGRESS NOTES
Received outside labs dated 12/16/20:   TSH 0.94, FT4 1.5    Nacho ADLER.  [address] [facility]  [address] [place] [address]

## 2021-01-06 ENCOUNTER — TELEPHONE (OUTPATIENT)
Dept: ENDOCRINOLOGY | Age: 56
End: 2021-01-06

## 2021-01-06 ENCOUNTER — VIRTUAL VISIT (OUTPATIENT)
Dept: ENDOCRINOLOGY | Age: 56
End: 2021-01-06
Payer: COMMERCIAL

## 2021-01-06 DIAGNOSIS — E06.3 HYPOTHYROIDISM DUE TO HASHIMOTO'S THYROIDITIS: ICD-10-CM

## 2021-01-06 DIAGNOSIS — E03.8 HYPOTHYROIDISM DUE TO HASHIMOTO'S THYROIDITIS: ICD-10-CM

## 2021-01-06 DIAGNOSIS — E04.1 THYROID NODULE: Primary | ICD-10-CM

## 2021-01-06 PROCEDURE — 99214 OFFICE O/P EST MOD 30 MIN: CPT | Performed by: INTERNAL MEDICINE

## 2021-01-06 RX ORDER — AMLODIPINE BESYLATE 10 MG/1
TABLET ORAL
COMMUNITY
Start: 2020-10-13

## 2021-01-06 RX ORDER — VALSARTAN 320 MG/1
TABLET ORAL
COMMUNITY
Start: 2020-10-06

## 2021-01-06 NOTE — PROGRESS NOTES
**DUE TO PANDEMIC AND HEALTH CONCERNS IN THE COMMUNITY, THIS PATIENT WAS EITHER ILL OR FOUND TO BE HIGH RISK FOR IN-PERSON EVALUATION WITHIN THE CLINIC. THE FOLLOWING IS A VIRTUAL TELEMEDICINE VIDEO ENCOUNTER VIA [employer], TO WHICH THE PATIENT AGREED. THE PURPOSE IS TO LIMIT INTERRUPTIONS IN HEALTHCARE AND TO PROVIDE FOR ONGOING URGENT NEEDS UNDER THE CURRENT CONDITIONS. CHIEF COMPLAINT: f/u thyroid nodule, hashimoto disease    HISTORY OF PRESENT ILLNESS:   Ayden Gamboa is a 48 y.o. female with a PMHx as noted below who presents for f/u evaluation of a thyroid nodule and labs suggestive of hashimoto thyroiditis. Last visit was in June 2020. Recent diagnosis of severe sleep apnea just recently started on CPAP. Reports recent cardiology evaluation with report of mildly dilated ascending aorta. Hypothyroidism:  Hx of CREST syndrome and Hashimoto thyroiditis. Outiside Tg and TPO antibodies were positive,  We started therapy with thyroid hormone replacement,  Most recently we had her on 50 mcg levothyroxine once daily,  She is without overt symptoms of hyper or hypothyroidism,  Received outside labs dated 12/16/20:    TSH 0.94, FT4 1.5    Thyroid nodules:  Also noted for a thyroid nodule with endocrinologist in [place] in Sept,  12/18/18: Thyroid FNA Biopsy: well circ, R-mid 1.7 x 0.8 x 1.3 cm nodule,    cytology: neg for malignancy, findings of hashimoto present.   6/15/20: Right-mid 1.6 x 0.8 x 1.4 cm nodule,    Previously biopsied as noted above,    Relatively unchanged in size,    Review of other most recent thyroid function:  Lab Results   Component Value Date    TSH 1.500 01/04/2019    FT4 1.29 01/04/2019      TSILT = Thyroid stimulating antibodies  TMCLT = TPO antibodies  T3LT = Total T3 levels    PAST MEDICAL/SURGICAL HISTORY:   Past Medical History:   Diagnosis Date    CREST syndrome ([facility] [address]     Hashimoto's disease     Hypertension     Migraines      Past Surgical History:   Procedure Laterality Date    HX BREAST AUGMENTATION      HX CHOLECYSTECTOMY      HX GYN      eptopic pregnancy    HX ORTHOPAEDIC      both thumb joints replaced    HX TUBAL LIGATION         ALLERGIES:   No Known Allergies    MEDICATIONS ON ADMISSION:     Current Outpatient Medications:     amLODIPine (NORVASC) 10 mg tablet, TAKE 1 TABLET BY MOUTH EVERY DAY, Disp: , Rfl:     valsartan (DIOVAN) 320 mg tablet, TAKE 1 TABLET BY MOUTH EVERY DAY, Disp: , Rfl:     calcium carbonate (CALCIUM 600 PO), Take  by mouth., Disp: , Rfl:     levothyroxine (SYNTHROID) 50 mcg tablet, TAKE 1 TABLET BY MOUTH ONCE DAILY BEFORE BREAKFAST, Disp: 90 Tab, Rfl: 3    esomeprazole (NEXIUM) 40 mg capsule, , Disp: , Rfl: 0    colestipol (COLESTID) 1 gram tablet, , Disp: , Rfl: 0    cholecalciferol, vitamin D3, (VITAMIN D3) 2,000 unit tab, Take 4,000 Units by mouth daily. , Disp: , Rfl:     moxifloxacin (VIGAMOX) 0.5 % ophthalmic solution, instill 1 drop INTO AFFECTED EYE(S) three times a day for 7 days, Disp: , Rfl: 0    gabapentin (NEURONTIN) 100 mg capsule, give MILDRED 2 capsules by mouth every 12 hours, Disp: , Rfl: 0    amLODIPine-valsartan (EXFORGE) 5-320 mg per tablet, , Disp: , Rfl:     SUMAtriptan (IMITREX) 100 mg tablet, 100 mg., Disp: , Rfl:     OTHER, selinium 200 mg QD, Disp: , Rfl:     SOCIAL HISTORY:   Social History     Socioeconomic History    Marital status:      Spouse name: Not on file    Number of children: Not on file    Years of education: Not on file    Highest education level: Not on file   Occupational History    Not on file   Social Needs    Financial resource strain: Not on file    Food insecurity     Worry: Not on file     Inability: Not on file   [employer] needs     Medical: Not on file     Non-medical: Not on file   Tobacco Use    Smoking status: Never Smoker    Smokeless tobacco: Never Used   Substance and Sexual Activity    Alcohol use: Yes     Comment: Rarely    Drug use: No    Sexual activity: Not on file   Lifestyle    Physical activity     Days per week: Not on file     Minutes per session: Not on file    Stress: Not on file   Relationships    Social connections     Talks on phone: Not on file     Gets together: Not on file     Attends Hinduism service: Not on file     Active member of club or organization: Not on file     Attends meetings of clubs or organizations: Not on file     Relationship status: Not on file    Intimate partner violence     Fear of current or ex partner: Not on file     Emotionally abused: Not on file     Physically abused: Not on file     Forced sexual activity: Not on file   Other Topics Concern    Not on file   Social History Narrative    Not on file       FAMILY HISTORY:  Family History   Problem Relation Age of Onset    Thyroid Disease Mother     Osteoporosis Mother     No Known Problems Father     Dementia Maternal Grandmother     No Known Problems Maternal Grandfather        REVIEW OF SYSTEMS: Complete ROS assessed and noted for that which is described above, all else are negative. Eyes: normal  ENT: normal  CVS: normal  Resp: normal  GI: normal  : normal  GYN: normal  Endocrine: normal  Integument: normal  Musculoskeletal: normal  Neuro: normal  Psych: normal    PHYSICAL EXAMINATION:  Telemedicine Visit    GENERAL: NCAT, Appears well nourished  EYES: EOMI, non-icteric, no proptosis  Ear/Nose/Throat: NCAT, no visible inflammation or masses  CARDIOVASCULAR: no cyanosis, no visible JVD  RESPIRATORY: comfortable respirations observed, no cyanosis  MUSCULOSKELETAL: Normal ROM of upper extremities observed  SKIN: No edema, rash, or other significant changes observed  NEUROLOGIC:  AAOx3  PSYCHIATRIC: Normal affect, Normal insight and judgement    REVIEW OF LABORATORY AND RADIOLOGY DATA:   Labs and documentation have been reviewed as described above.          ASSESSMENT AND PLAN:   Connor Zuniga is a 48 y.o. female with a PMHx as noted above who presents for f/u of a thyroid nodule and hashimoto thyroiditis. Thyroid Nodule  Hashimoto Thyroiditis    Hypothyroidism: To take 50 mcg once daily of levothyroxine,   With evidence of + hashimoto on biospy and imaging findings, will likely need dose titration in the future,  Thyroid levels before next visit, lab slip again mailed to her home for next visit,    Thyroid Nodule:   Stable thyroid US when it was repeated / reviewed, select images above,  Biopsy suggesting benign findings are reassuring,  Next ultrasound can be around 2023 unless she has symptoms warranting sooner evaluation,     RTC: June 23 at 2:50 PM    20 minutes spent toward telemedicine visit today of which >50% of this time was spent in counseling and coordination of care. Mikey Shankar.  [address] [facility]

## 2021-03-17 RX ORDER — LEVOTHYROXINE SODIUM 50 UG/1
TABLET ORAL
Qty: 90 TAB | Refills: 3 | Status: SHIPPED | OUTPATIENT
Start: 2021-03-17

## 2021-07-13 ENCOUNTER — TELEPHONE (OUTPATIENT)
Dept: ENDOCRINOLOGY | Age: 56
End: 2021-07-13

## 2021-07-13 NOTE — TELEPHONE ENCOUNTER
I attempted to call Ms. Mohan to let her know that she had a MyChart message that she has not read yet. I reached her voice mail and I left a message letting her know this and to go in and read it and give me a call back if she still needed anything done.   Anibal Triplett

## 2021-11-30 ENCOUNTER — TRANSCRIBE ORDER (OUTPATIENT)
Dept: SCHEDULING | Age: 56
End: 2021-11-30

## 2021-11-30 DIAGNOSIS — E04.1 THYROID NODULE: Primary | ICD-10-CM

## 2021-12-21 ENCOUNTER — TRANSCRIBE ORDER (OUTPATIENT)
Dept: SCHEDULING | Age: 56
End: 2021-12-21

## 2021-12-21 DIAGNOSIS — E04.1 NODULE OF LEFT LOBE OF THYROID GLAND: Primary | ICD-10-CM

## 2021-12-27 ENCOUNTER — HOSPITAL ENCOUNTER (OUTPATIENT)
Dept: ULTRASOUND IMAGING | Age: 56
Discharge: HOME | End: 2021-12-27

## 2021-12-27 DIAGNOSIS — E04.1 NODULE OF LEFT LOBE OF THYROID GLAND: ICD-10-CM

## 2023-05-24 RX ORDER — ESOMEPRAZOLE MAGNESIUM 40 MG/1
CAPSULE, DELAYED RELEASE ORAL
COMMUNITY
Start: 2019-03-05

## 2023-05-24 RX ORDER — SUMATRIPTAN 100 MG/1
100 TABLET, FILM COATED ORAL
COMMUNITY
Start: 2016-01-06

## 2023-05-24 RX ORDER — MOXIFLOXACIN 5 MG/ML
SOLUTION/ DROPS OPHTHALMIC
COMMUNITY
Start: 2019-12-01

## 2023-05-24 RX ORDER — AMLODIPINE BESYLATE 10 MG/1
1 TABLET ORAL DAILY
COMMUNITY
Start: 2020-10-13

## 2023-05-24 RX ORDER — LEVOTHYROXINE SODIUM 0.05 MG/1
TABLET ORAL
COMMUNITY
Start: 2021-03-17

## 2023-05-24 RX ORDER — GABAPENTIN 100 MG/1
CAPSULE ORAL
COMMUNITY
Start: 2019-01-14

## 2023-05-24 RX ORDER — AMLODIPINE AND VALSARTAN 5; 320 MG/1; MG/1
TABLET ORAL
COMMUNITY
Start: 2017-07-13

## 2023-05-24 RX ORDER — MONTELUKAST SODIUM 4 MG/1
TABLET, CHEWABLE ORAL
COMMUNITY
Start: 2018-12-18

## 2023-05-24 RX ORDER — VALSARTAN 320 MG/1
1 TABLET ORAL DAILY
COMMUNITY
Start: 2020-10-06

## 2023-07-21 ENCOUNTER — HOSPITAL ENCOUNTER (OUTPATIENT)
Facility: HOSPITAL | Age: 58
Discharge: HOME | End: 2023-07-21
Attending: SPECIALIST
Payer: COMMERCIAL

## 2023-07-21 DIAGNOSIS — E04.1 THYROID NODULE: ICD-10-CM

## 2023-07-21 PROCEDURE — 76536 US EXAM OF HEAD AND NECK: CPT

## 2023-09-11 ENCOUNTER — HOSPITAL ENCOUNTER (OUTPATIENT)
Facility: HOSPITAL | Age: 58
Discharge: HOME | End: 2023-09-14
Payer: COMMERCIAL

## 2023-09-11 DIAGNOSIS — M13.0 POLYARTHROPATHY: ICD-10-CM

## 2023-09-11 DIAGNOSIS — M79.18 DIFFUSE MYOFASCIAL PAIN SYNDROME: ICD-10-CM

## 2023-09-11 PROCEDURE — 73130 X-RAY EXAM OF HAND: CPT

## 2023-09-11 PROCEDURE — 73630 X-RAY EXAM OF FOOT: CPT

## 2024-10-23 ENCOUNTER — HOSPITAL ENCOUNTER (OUTPATIENT)
Facility: HOSPITAL | Age: 59
Discharge: HOME | End: 2024-10-26
Attending: SPECIALIST
Payer: COMMERCIAL

## 2024-10-23 DIAGNOSIS — E04.2 NONTOXIC MULTINODULAR GOITER: ICD-10-CM

## 2024-10-23 PROCEDURE — 76536 US EXAM OF HEAD AND NECK: CPT
